# Patient Record
(demographics unavailable — no encounter records)

---

## 2018-01-12 NOTE — PROGRESS NOTES
Assessment    1  Encounter for preventive health examination (V70 0) (Z00 00)    Plan  Health Maintenance    · Always use a seat belt and shoulder strap when riding or driving a motor vehicle ;  Status:Complete;   Done: [id number]   · Begin or continue regular aerobic exercise  Gradually work up to at least 3 sessions of 30  minutes of exercise a week ; Status:Complete;   Done: [id number]   · Brush your teeth 3 times a day and floss at least once a day ; Status:Complete;   Done:  [id number]   · Eat a normal well-balanced diet ; Status:Complete;   Done: [id number]   · Schedule an appointment in 48-72 hours to have your TB (tuberculin) skin test  interpreted by a trained healthcare provider ; Status:Complete;   Done: [id number]   · Stretch and warm up your muscles during the first 10 minutes , then cool down your  muscles for the last 10 minutes of exercise ; Status:Complete;   Done: [id number]   · There are many ways to reduce your risk of catching or spreading a sexually transmitted  Infection ; Status:Complete;   Done: [id number]   · Use a sun block product with an SPF of 15 or more ; Status:Complete;   Done:  [id number]   · We recommend routine visits to a dentist ; Status:Complete;   Done: 50ATY8822   · We recommend that you follow these rules for gun safety ; Status:Complete;   Done:  08YKJ5027   · Call [phone] if: You find a new or different kind of lump in your breast ;  Status:Complete;   Done: 65UHJ2400   · Call [phone] if: You have any warning signs of skin cancer ; Status:Complete;    Done: 45ADB9139   · Follow-up visit in 1 year Evaluation and Treatment  Follow-up  Status: Complete  Done:  21Jun2016  PMH: History of varicella    · Varivax 1350 PFU/0 5ML Subcutaneous Injectable  PPD screening test    · Tubersol 5 UNIT/0 1ML Intradermal Solution    Discussion/Summary  health maintenance visit Currently, she eats a healthy diet and has an adequate exercise regimen   cervical cancer screening is managed by GYNECOLOGIST Breast cancer screening: the risks and benefits of breast cancer screening were discussed and monthly self breast exam was advised  Colorectal cancer screening: colorectal cancer screening is not indicated  The immunizations are up to date  Advice and education were given regarding sunscreen use and seat belt use  Patient discussion: discussed with the patient  NEEDS PPD FOR NURSING SCHOOL  Chief Complaint  PATIENT PRESENT TODAY ALONE FOR 20Y WELL      History of Present Illness  HM, Adult Female: The patient is being seen for a health maintenance evaluation  The last health maintenance visit was 1 year(s) ago  Social History: Household members include 1 son(s) and parents  She is   The patient has never smoked cigarettes  She reports never drinking alcohol  She has never used illicit drugs  General Health: The patient's health since the last visit is described as good  She has regular dental visits  The patient brushes 2 time(s) a day  Immunizations status: up to date   WEARS GLASSES  Lifestyle:  She consumes a diverse and healthy diet  She exercises regularly  She exercises 4-5 times per week  Exercise includes running/jogging  Reproductive health:  she reports normal menses  Screening:      Review of Systems    Constitutional: No fever, no chills, feels well, no tiredness, no recent weight gain or weight loss  Eyes: No complaints of eye pain, no red eyes, no eyesight problems, no discharge, no dry eyes, no itching of eyes  ENT: no complaints of earache, no loss of hearing, no nose bleeds, no nasal discharge, no sore throat, no hoarseness  Cardiovascular: No complaints of slow heart rate, no fast heart rate, no chest pain, no palpitations, no leg claudication, no lower extremity edema  Respiratory: No complaints of shortness of breath, no wheezing, no cough, no SOB on exertion, no orthopnea, no PND     Gastrointestinal: No complaints of abdominal pain, no constipation, no nausea or vomiting, no diarrhea, no bloody stools  Genitourinary: No complaints of dysuria, no incontinence, no pelvic pain, no dysmenorrhea, no vaginal discharge or bleeding  Musculoskeletal: No complaints of arthralgias, no myalgias, no joint swelling or stiffness, no limb pain or swelling  Integumentary: No complaints of skin rash or lesions, no itching, no skin wounds, no breast pain or lump  Neurological: No complaints of headache, no confusion, no convulsions, no numbness, no dizziness or fainting, no tingling, no limb weakness, no difficulty walking  Psychiatric: Not suicidal, no sleep disturbance, no anxiety or depression, no change in personality, no emotional problems  Endocrine: No complaints of proptosis, no hot flashes, no muscle weakness, no deepening of the voice, no feelings of weakness  Hematologic/Lymphatic: No complaints of swollen glands, no swollen glands in the neck, does not bleed easily, does not bruise easily  Active Problems    1  Allergic rhinitis (477 9) (J30 9)   2   Mild scoliosis (737 39) (M41 9)    Past Medical History    · History of Anxiety (300 00) (F41 9)   · History of Hearing loss, sensorineural, high frequency (389 8) (H91 90)   · History of syncope (V15 89) (Z87 898)   · History of varicella (V12 09) (Z86 19)   · History of Need for tuberculosis vaccination (V03 2) (Z23)    Surgical History    · History of Oral Surgery Tooth Extraction    Family History  Mother    · Family history of Multiple allergies  Father    · Family history of Multiple allergies  Maternal Grandmother    · Family history of asthma (V17 5) (Z82 5)   · Family history of hypertension (V17 49) (Z82 49)   · Family history of malignant neoplasm (V16 9) (Z80 9)  Maternal Grandfather    · Family history of cardiac disorder (V17 49) (Z82 49)   · Family history of malignant neoplasm (V16 9) (Z80 9)  Paternal Grandfather    · Family history of malignant neoplasm (V16 9) (Z80 9)    Social History    · Activities: Cheerleading   · Brushes teeth twice a day   · Flosses teeth regularly   · Lives with parents ()   · Never a smoker   · No tobacco/smoke exposure   · Pets/Animals: Dog   · Seeing a dentist   · Sleeps 6 -7 hours a day    Current Meds   1  Ortho Tri-Cyclen Lo 0 18/0 215/0 25 MG-25 MCG Oral Tablet; Therapy: (Recorded:23Jun2015) to Recorded   2  Symbicort AERO; Therapy: (Recorded:23Jun2015) to Recorded    Allergies    1  No Known Drug Allergies    2  Animal dander - Cats   3  Animal dander - Dogs   4  Dust Mite   5  Mold   6  Other   7  Pollen    Vitals   Recorded: 21Jun2016 04:36PM   Heart Rate 80, L Radial   Pulse Quality Normal, L Radial   Respiration 20   Respiration Quality Normal   Systolic 415, LUE, Sitting   Diastolic 60, LUE, Sitting   Height 5 ft 3 in   Weight 119 lb 12 00 oz   BMI Calculated 21 21   BSA Calculated 1 56     Physical Exam    Constitutional   General appearance: No acute distress, well appearing and well nourished  Head and Face   Palpation of the face and sinuses: No sinus tenderness  Eyes   Conjunctiva and lids: No swelling, erythema or discharge  Pupils and irises: Equal, round, reactive to light  Ears, Nose, Mouth, and Throat   External inspection of ears and nose: Normal     Otoscopic examination: Tympanic membranes translucent with normal light reflex  Canals patent without erythema  Hearing: Normal     Nasal mucosa, septum, and turbinates: Normal without edema or erythema  Lips, teeth, and gums: Normal, good dentition  Oropharynx: Normal with no erythema, edema, exudate or lesions  Neck   Neck: Supple, symmetric, trachea midline, no masses  Thyroid: Normal, no thyromegaly  Pulmonary   Respiratory effort: No increased work of breathing or signs of respiratory distress  Percussion of chest: Normal     Palpation of chest: Normal     Auscultation of lungs: Clear to auscultation      Cardiovascular Palpation of heart: Normal PMI, no thrills  Auscultation of heart: Normal rate and rhythm, normal S1 and S2, no murmurs  Carotid pulses: 2+ bilaterally  Abdominal aorta: Normal     Femoral pulses: 2+ bilaterally  Pedal pulses: 2+ bilaterally  Examination of extremities for edema and/or varicosities: Normal     Chest SEES GYNECOLOGIST  Abdomen   Abdomen: Non-tender, no masses  Liver and spleen: No hepatomegaly or splenomegaly  Examination for hernias: No hernia appreciated  Genitourinary SEES GYNECOLOGIST  Lymphatic   Palpation of lymph nodes in neck: No lymphadenopathy  Palpation of lymph nodes in axillae: No lymphadenopathy  Palpation of lymph nodes in groin: No lymphadenopathy  Musculoskeletal   Gait and station: Normal     Digits and nails: Normal without clubbing or cyanosis  Joints, bones, and muscles: Normal     Range of motion: Normal     Stability: Normal     Muscle strength/tone: Normal     Skin   Skin and subcutaneous tissue: Normal without rashes or lesions  Palpation of skin and subcutaneous tissue: Normal turgor  Neurologic   Cranial nerves: Cranial nerves II-XII intact  Reflexes: 2+ and symmetric  Sensation: No sensory loss  Psychiatric   Judgment and insight: Normal     Orientation to person, place, and time: Normal     Recent and remote memory: Intact  Mood and affect: Normal        Results/Data  PHQ-2 Adult Depression Screening 21Jun2016 04:36PM User, s     Test Name Result Flag Reference   PHQ-2 Adult Depression Score 0     Over the last two weeks, how often have you been bothered by any of the following problems?   Little interest or pleasure in doing things: Not at all - 0  Feeling down, depressed, or hopeless: Not at all - 0   PHQ-2 Adult Depression Screening Negative         Signatures   Electronically signed by : Keiko Alston MD; Jun 22 2016 11:03AM EST                       (Author)

## 2020-04-29 DIAGNOSIS — Z30.09 ENCOUNTER FOR OTHER GENERAL COUNSELING OR ADVICE ON CONTRACEPTION: Primary | ICD-10-CM

## 2020-04-29 RX ORDER — NORGESTIMATE AND ETHINYL ESTRADIOL 7DAYSX3 28
1 KIT ORAL DAILY
Qty: 84 TABLET | Refills: 1 | Status: SHIPPED | OUTPATIENT
Start: 2020-04-29 | End: 2020-11-09

## 2020-11-07 DIAGNOSIS — Z30.09 ENCOUNTER FOR OTHER GENERAL COUNSELING OR ADVICE ON CONTRACEPTION: ICD-10-CM

## 2020-11-09 RX ORDER — NORGESTIMATE AND ETHINYL ESTRADIOL 7DAYSX3 28
KIT ORAL
Qty: 84 TABLET | Refills: 1 | Status: SHIPPED | OUTPATIENT
Start: 2020-11-09